# Patient Record
(demographics unavailable — no encounter records)

---

## 2025-05-07 NOTE — HEALTH RISK ASSESSMENT
[Never (0 pts)] : Never (0 points) [No] : In the past 12 months have you used drugs other than those required for medical reasons? No [No falls in past year] : Patient reported no falls in the past year [Little interest or pleasure doing things] : 1) Little interest or pleasure doing things [Feeling down, depressed, or hopeless] : 2) Feeling down, depressed, or hopeless [0] : 2) Feeling down, depressed, or hopeless: Not at all (0) [PHQ-2 Negative - No further assessment needed] : PHQ-2 Negative - No further assessment needed [Never] : Never [Audit-CScore] : 0 [Change in mental status noted] : No change in mental status noted [Language] : denies difficulty with language [Behavior] : denies difficulty with behavior [Learning/Retaining New Information] : denies difficulty learning/retaining new information [Handling Complex Tasks] : denies difficulty handling complex tasks [Reasoning] : denies difficulty with reasoning [Spatial Ability and Orientation] : denies difficulty with spatial ability and orientation [Sexually Active] : not sexually active [High Risk Behavior] : no high risk behavior [Reports changes in hearing] : Reports no changes in hearing [Reports changes in vision] : Reports no changes in vision [Reports changes in dental health] : Reports no changes in dental health [Sunscreen] : does not use sunscreen [Travel to Developing Areas] : does not  travel to developing areas [TB Exposure] : is not being exposed to tuberculosis [Caregiver Concerns] : does not have caregiver concerns [ColonoscopyDate] : 4 years ago [de-identified] : no [de-identified] : no [de-identified] : pt is not active  [de-identified] : pt have a good diet  [[id number]] : 0

## 2025-05-07 NOTE — HISTORY OF PRESENT ILLNESS
[Other: _____] : [unfilled] [FreeTextEntry8] : 69 M with HTN (diagnosed 10 years) presenting for an acute visit. He checks BP at home and it is well controlled. Medications include amlodipine 5 mg, enalapril 5 mg and omega-3 fatty acids. Reports syncopal episode last night (occurred while kneeling on the floor), did not have any symptoms prior except for dizziness. EMS was called and FS glucose was around 200, EKG was normal, and BP was on the low side (106 systolic). He is also reporting allergy symptoms.

## 2025-05-07 NOTE — HEALTH RISK ASSESSMENT
[Never (0 pts)] : Never (0 points) [No] : In the past 12 months have you used drugs other than those required for medical reasons? No [No falls in past year] : Patient reported no falls in the past year [Little interest or pleasure doing things] : 1) Little interest or pleasure doing things [Feeling down, depressed, or hopeless] : 2) Feeling down, depressed, or hopeless [0] : 2) Feeling down, depressed, or hopeless: Not at all (0) [PHQ-2 Negative - No further assessment needed] : PHQ-2 Negative - No further assessment needed [Never] : Never [Audit-CScore] : 0 [Change in mental status noted] : No change in mental status noted [Language] : denies difficulty with language [Behavior] : denies difficulty with behavior [Learning/Retaining New Information] : denies difficulty learning/retaining new information [Handling Complex Tasks] : denies difficulty handling complex tasks [Reasoning] : denies difficulty with reasoning [Spatial Ability and Orientation] : denies difficulty with spatial ability and orientation [Sexually Active] : not sexually active [High Risk Behavior] : no high risk behavior [Reports changes in hearing] : Reports no changes in hearing [Reports changes in vision] : Reports no changes in vision [Reports changes in dental health] : Reports no changes in dental health [Sunscreen] : does not use sunscreen [Travel to Developing Areas] : does not  travel to developing areas [TB Exposure] : is not being exposed to tuberculosis [Caregiver Concerns] : does not have caregiver concerns [ColonoscopyDate] : 4 years ago [de-identified] : no [de-identified] : no [de-identified] : pt is not active  [de-identified] : pt have a good diet  [[id number]] : 0

## 2025-05-08 NOTE — HISTORY OF PRESENT ILLNESS
[Home] : at home, [unfilled] , at the time of the visit. [Medical Office: ([facility])___] : at the medical office located in  [Telehealth (audio & video)] : This visit was provided via telehealth using real-time 2-way audio visual technology. [Verbal consent obtained from patient] : the patient, [unfilled] [de-identified] : 69 M with HTN presenting for a follow up visit. Lab results reviewed with patient virtually. Patient verbalized understanding and all questions answered. He states that his last blood work was checked in December 2024 and he was not told of any lipid abnormalities.  The patient had a colonoscopy done on May 12, 2022 and was told to repeated in 5 years.

## 2025-05-21 NOTE — HISTORY OF PRESENT ILLNESS
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  He has a history of hyperlipidemia recently started on atorvastatin and hypertension for many years controlled on medications and now only taking amlodipine 5 mg once a day.  He complains of mild dyspnea on exertion no chest pain occasional palpitations no syncope he has a very strong family history of coronary disease in both parents and is here to rule out underlying heart disease.

## 2025-05-21 NOTE — ASSESSMENT
[FreeTextEntry1] : Shortness of breath on exertion rule out structural heart disease/coronary artery disease we will get a stress echo and an echocardiogram.  Chest pain atypical rule out coronary artery disease we will get stress echo.  Erectile dysfunction referred to urology for evaluation.  Hypertension controlled continue current medications.  Mixed hyperlipidemia recent LDL is elevated goal is below 100 he has been recently started on a statin we will monitor and follow-up discussed about lifestyle changes and diet as well.  Next

## 2025-05-21 NOTE — PHYSICAL EXAM
[Well Developed] : well developed [Obese] : obese [Normal Conjunctiva] : normal conjunctiva [Normal Venous Pressure] : normal venous pressure [No Carotid Bruit] : no carotid bruit [Normal S1, S2] : normal S1, S2 [S4] : S4 [Murmur] : murmur [Clear Lung Fields] : clear lung fields [Soft] : abdomen soft [Non Tender] : non-tender [Normal Gait] : normal gait [No Edema] : no edema [Normal Radial B/L] : normal radial B/L [Normal PT B/L] : normal PT B/L [de-identified] : [facility] at [facility]

## 2025-05-21 NOTE — REASON FOR VISIT
[Symptom and Test Evaluation] : symptom and test evaluation [Hyperlipidemia] : hyperlipidemia [Hypertension] : hypertension [Coronary Artery Disease] : coronary artery disease [FreeTextEntry3] : ian

## 2025-06-10 NOTE — END OF VISIT
[FreeTextEntry4] : This note was written by Tamika Melo on 06/10/2025 actively solely Willard Hansen M.D. I, Tamika Melo, am scribing for and in the presence of Willard Hansen M.D. in the following sections HISTORY OF PRESENT ILLNESS, PAST MEDICAL/FAMILY/SOCIAL HISTORY; REVIEW OF SYSTEMS; VITAL SIGNS; PHYSICAL EXAM; ASSESSMENT/PLAN. All medical record entries made by this scribe at , Willard Hansen M.D. direction and personally dictated by me on 06/10/2025. I personally performed the services described in the documentation, reviewed the documentation recorded by the scribe in my presence, and it accurately and completely records my words and actions.

## 2025-06-10 NOTE — ASSESSMENT
[FreeTextEntry1] : Pt is a 69 year old malewith bph  pvr reviewed minimal  checkpsa  We had a long discussion regarding the urinary symptoms and management options. Patient should try to restrict fluids a few hours before bedtime, elevate feet if there is any edema and avoid irritating drinks or foods such as coffee, tea, alcohol, spicy foods, etc. Medical management with alpha blockers (such as Flomax, Rapaflo), 5 alpha reductase inhibitors (such as Finasteride, Avodart), daily Cialis especially if there is erectile dysfunction, anticholinergic medications (such as Ditropan, Vesicare, Toviaz, Oxytrol patches) and Myrbetriq was reviewed. Surgical options discussed included photo-vaporization of the prostate also known as Greenlight laser, microwave thermotherapy, transurethral resection of the prostate (TURP) or suprapubic prostatectomy depending on the size of prostate. We also discussed intra-vesical injection of Botox for overactive bladder symptoms. Risks and benefits of each treatment were discussed included but were not limited to worsening urinary symptoms, incontinence, hematuria, erectile dysfunction, ED, retrograde ejaculation, etc. Also, workup with cystoscopy, post-void residual determination and urodynamics study and their indications were reviewed. will cont alfuzosin for now    Patient is being seen today for evaluation and management of a chronic and longitudinal ongoing condition and I am of the primary treating physician.

## 2025-06-10 NOTE — HISTORY OF PRESENT ILLNESS
[FreeTextEntry1] : 06/10/2025 cc Pt is a 69 year old male presenting for a new patient visit. Pt reports seeing a urologist for nocturia 2-3 x per night. pt reports being given alfuzosin which has not helped. Pt reports that when he stops taking alfuzosin his symptoms worsen, and he has to push to urinate. Pt denies dysuria and hematuria. Pt denies having sexual partner. Pt denies family history of prostate cancer. Pt denies snoring. Pt reports drinking green tea once per week and water daily